# Patient Record
Sex: FEMALE | Race: WHITE
[De-identification: names, ages, dates, MRNs, and addresses within clinical notes are randomized per-mention and may not be internally consistent; named-entity substitution may affect disease eponyms.]

---

## 2017-09-12 ENCOUNTER — HOSPITAL ENCOUNTER (INPATIENT)
Dept: HOSPITAL 1 - ED | Age: 57
LOS: 2 days | Discharge: HOME | DRG: 243 | End: 2017-09-14
Attending: FAMILY MEDICINE | Admitting: FAMILY MEDICINE
Payer: COMMERCIAL

## 2017-09-12 VITALS — HEIGHT: 61 IN | BODY MASS INDEX: 25.57 KG/M2 | WEIGHT: 135.43 LBS

## 2017-09-12 VITALS — SYSTOLIC BLOOD PRESSURE: 131 MMHG | DIASTOLIC BLOOD PRESSURE: 56 MMHG

## 2017-09-12 VITALS — DIASTOLIC BLOOD PRESSURE: 70 MMHG | SYSTOLIC BLOOD PRESSURE: 109 MMHG

## 2017-09-12 DIAGNOSIS — Z53.29: ICD-10-CM

## 2017-09-12 DIAGNOSIS — K21.9: Primary | ICD-10-CM

## 2017-09-12 DIAGNOSIS — E44.1: ICD-10-CM

## 2017-09-12 DIAGNOSIS — F15.10: ICD-10-CM

## 2017-09-12 DIAGNOSIS — M94.0: ICD-10-CM

## 2017-09-12 DIAGNOSIS — E11.65: ICD-10-CM

## 2017-09-12 DIAGNOSIS — I10: ICD-10-CM

## 2017-09-12 DIAGNOSIS — F11.10: ICD-10-CM

## 2017-09-12 DIAGNOSIS — Z79.899: ICD-10-CM

## 2017-09-12 DIAGNOSIS — E78.00: ICD-10-CM

## 2017-09-12 DIAGNOSIS — N39.0: ICD-10-CM

## 2017-09-12 DIAGNOSIS — E03.9: ICD-10-CM

## 2017-09-12 DIAGNOSIS — Z88.6: ICD-10-CM

## 2017-09-12 DIAGNOSIS — E83.39: ICD-10-CM

## 2017-09-12 DIAGNOSIS — K76.0: ICD-10-CM

## 2017-09-12 LAB
ALBUMIN SERPL-MCNC: 3.5 G/DL (ref 3.4–5)
ALP SERPL-CCNC: 151 U/L (ref 46–116)
ALT SERPL-CCNC: 39 U/L (ref 14–59)
AST SERPL-CCNC: 33 U/L (ref 15–37)
BASOPHILS NFR BLD: 0.6 % (ref 0–2)
BILIRUB SERPL-MCNC: 0.3 MG/DL (ref 0.2–1)
BUN SERPL-MCNC: 14 MG/DL (ref 7–18)
CALCIUM SERPL-MCNC: 8.4 MG/DL (ref 8.5–10.1)
CHLORIDE SERPL-SCNC: 107 MMOL/L (ref 98–107)
CO2 SERPL-SCNC: 29.7 MMOL/L (ref 21–32)
CREAT SERPL-MCNC: 0.7 MG/DL (ref 0.6–1)
ERYTHROCYTE [DISTWIDTH] IN BLOOD BY AUTOMATED COUNT: 14 % (ref 11.5–14.5)
GFR SERPLBLD BASED ON 1.73 SQ M-ARVRAT: > 60 ML/MIN
GLUCOSE SERPL-MCNC: 95 MG/DL (ref 74–106)
LIPASE SERPL-CCNC: 335 IU/L (ref 73–393)
MAGNESIUM SERPL-MCNC: 1.8 MG/DL (ref 1.8–2.4)
MICROSCOPIC UR-IMP: YES
PHOSPHATE SERPL-MCNC: 2.9 MG/DL (ref 2.5–4.9)
PLATELET # BLD: 210 X10^3MCL (ref 130–400)
POTASSIUM SERPL-SCNC: 3.7 MMOL/L (ref 3.5–5.1)
PROT SERPL-MCNC: 7.2 G/DL (ref 6.4–8.2)
RBC # UR STRIP.AUTO: (no result) /UL
SODIUM SERPL-SCNC: 141 MMOL/L (ref 136–145)
T3 SERPL-MCNC: 0.98 NG/ML
T3RU NFR SERPL: 37 % UPTAKE (ref 30–39)
T4 FREE SERPL-MCNC: 1.06 NG/DL (ref 0.76–1.46)
T4 SERPL-MCNC: 7.4 UG/DL (ref 4.7–13.3)
T4/T3 UPTAKE INDEX SERPL: 2.7 UG/DL (ref 1.4–4.5)
UA SPECIFIC GRAVITY: 1.02 (ref 1–1.03)

## 2017-09-13 VITALS — SYSTOLIC BLOOD PRESSURE: 129 MMHG | DIASTOLIC BLOOD PRESSURE: 72 MMHG

## 2017-09-13 VITALS — DIASTOLIC BLOOD PRESSURE: 60 MMHG | SYSTOLIC BLOOD PRESSURE: 108 MMHG

## 2017-09-13 VITALS — DIASTOLIC BLOOD PRESSURE: 81 MMHG | SYSTOLIC BLOOD PRESSURE: 131 MMHG

## 2017-09-13 VITALS — DIASTOLIC BLOOD PRESSURE: 80 MMHG | SYSTOLIC BLOOD PRESSURE: 124 MMHG

## 2017-09-13 LAB — AMPHETAMINES UR QL SCN: POSITIVE

## 2017-09-14 VITALS — DIASTOLIC BLOOD PRESSURE: 81 MMHG | SYSTOLIC BLOOD PRESSURE: 120 MMHG

## 2017-09-14 VITALS — DIASTOLIC BLOOD PRESSURE: 69 MMHG | SYSTOLIC BLOOD PRESSURE: 110 MMHG

## 2017-09-14 LAB
BASOPHILS NFR BLD: 0.4 % (ref 0–2)
ERYTHROCYTE [DISTWIDTH] IN BLOOD BY AUTOMATED COUNT: 13.6 % (ref 11.5–14.5)
PLATELET # BLD: 194 X10^3MCL (ref 130–400)

## 2017-10-27 ENCOUNTER — HOSPITAL ENCOUNTER (EMERGENCY)
Dept: HOSPITAL 1 - ED | Age: 57
Discharge: HOME | End: 2017-10-27
Payer: COMMERCIAL

## 2017-10-27 VITALS — BODY MASS INDEX: 27.75 KG/M2 | WEIGHT: 141.36 LBS | HEIGHT: 60 IN

## 2017-10-27 VITALS — DIASTOLIC BLOOD PRESSURE: 78 MMHG | SYSTOLIC BLOOD PRESSURE: 132 MMHG
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
## 2017-10-27 DIAGNOSIS — S29.9XXA: Primary | ICD-10-CM
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
## 2017-10-27 DIAGNOSIS — F17.210: ICD-10-CM

## 2017-10-27 DIAGNOSIS — F12.90: ICD-10-CM
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
## 2017-10-27 DIAGNOSIS — Z71.6: ICD-10-CM
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
## 2017-10-27 DIAGNOSIS — E78.00: ICD-10-CM

## 2017-10-27 DIAGNOSIS — E89.0: ICD-10-CM

## 2018-08-29 ENCOUNTER — HOSPITAL ENCOUNTER (EMERGENCY)
Dept: HOSPITAL 1 - ED | Age: 58
Discharge: HOME | End: 2018-08-29
Payer: COMMERCIAL

## 2018-08-29 VITALS — SYSTOLIC BLOOD PRESSURE: 101 MMHG | DIASTOLIC BLOOD PRESSURE: 63 MMHG

## 2018-08-29 VITALS — WEIGHT: 142 LBS | HEIGHT: 60 IN | BODY MASS INDEX: 27.88 KG/M2

## 2018-08-29 DIAGNOSIS — R19.7: ICD-10-CM

## 2018-08-29 DIAGNOSIS — R10.33: Primary | ICD-10-CM

## 2018-08-29 DIAGNOSIS — R11.10: ICD-10-CM

## 2018-08-29 LAB
ALBUMIN SERPL-MCNC: 3.2 G/DL (ref 3.4–5)
ALP SERPL-CCNC: 134 U/L (ref 46–116)
ALT SERPL-CCNC: 32 U/L (ref 14–59)
AMYLASE SERPL-CCNC: 88 U/L (ref 25–115)
AST SERPL-CCNC: 22 U/L (ref 15–37)
BASOPHILS NFR BLD: 0.4 % (ref 0–2)
BILIRUB SERPL-MCNC: 0.2 MG/DL (ref 0.2–1)
BUN SERPL-MCNC: 13 MG/DL (ref 7–18)
CALCIUM SERPL-MCNC: 8.1 MG/DL (ref 8.5–10.1)
CHLORIDE SERPL-SCNC: 107 MMOL/L (ref 98–107)
CO2 SERPL-SCNC: 32.6 MMOL/L (ref 21–32)
CREAT SERPL-MCNC: 0.7 MG/DL (ref 0.6–1)
ERYTHROCYTE [DISTWIDTH] IN BLOOD BY AUTOMATED COUNT: 13.8 % (ref 11.5–14.5)
GFR SERPLBLD BASED ON 1.73 SQ M-ARVRAT: > 60 ML/MIN
GLUCOSE SERPL-MCNC: 112 MG/DL (ref 74–106)
LIPASE SERPL-CCNC: 413 IU/L (ref 73–393)
MICROSCOPIC UR-IMP: YES
PLATELET # BLD: 202 X10^3MCL (ref 130–400)
POTASSIUM SERPL-SCNC: 4.2 MMOL/L (ref 3.5–5.1)
PROT SERPL-MCNC: 6.5 G/DL (ref 6.4–8.2)
RBC # UR STRIP.AUTO: NEGATIVE /UL
SODIUM SERPL-SCNC: 143 MMOL/L (ref 136–145)
UA SPECIFIC GRAVITY: 1.02 (ref 1–1.03)

## 2019-03-12 ENCOUNTER — HOSPITAL ENCOUNTER (EMERGENCY)
Dept: HOSPITAL 1 - ED | Age: 59
Discharge: HOME | End: 2019-03-12
Payer: COMMERCIAL

## 2019-03-12 VITALS
HEIGHT: 64 IN | WEIGHT: 142.5 LBS | WEIGHT: 142.5 LBS | BODY MASS INDEX: 24.33 KG/M2 | HEIGHT: 64 IN | BODY MASS INDEX: 24.33 KG/M2

## 2019-03-12 VITALS — SYSTOLIC BLOOD PRESSURE: 141 MMHG | DIASTOLIC BLOOD PRESSURE: 73 MMHG

## 2019-03-12 DIAGNOSIS — S13.9XXA: ICD-10-CM
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
## 2019-03-12 DIAGNOSIS — I10: ICD-10-CM
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
## 2019-03-12 DIAGNOSIS — Z88.6: ICD-10-CM

## 2019-03-12 DIAGNOSIS — S22.019A: Primary | ICD-10-CM
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
## 2019-03-12 DIAGNOSIS — S39.012A: ICD-10-CM

## 2019-03-12 DIAGNOSIS — E78.00: ICD-10-CM

## 2019-03-25 ENCOUNTER — HOSPITAL ENCOUNTER (INPATIENT)
Dept: HOSPITAL 1 - ED | Age: 59
LOS: 2 days | Discharge: HOME | DRG: 203 | End: 2019-03-27
Attending: INTERNAL MEDICINE | Admitting: INTERNAL MEDICINE
Payer: COMMERCIAL

## 2019-03-25 VITALS
BODY MASS INDEX: 26.22 KG/M2 | WEIGHT: 148 LBS | WEIGHT: 148 LBS | HEIGHT: 63 IN | HEIGHT: 63 IN | BODY MASS INDEX: 26.22 KG/M2

## 2019-03-25 DIAGNOSIS — I10: ICD-10-CM

## 2019-03-25 DIAGNOSIS — R51: ICD-10-CM

## 2019-03-25 DIAGNOSIS — R07.89: Primary | ICD-10-CM

## 2019-03-25 DIAGNOSIS — E78.00: ICD-10-CM

## 2019-03-25 DIAGNOSIS — E78.5: ICD-10-CM

## 2019-03-25 DIAGNOSIS — F17.210: ICD-10-CM

## 2019-03-25 DIAGNOSIS — R73.03: ICD-10-CM

## 2019-03-25 DIAGNOSIS — E89.0: ICD-10-CM

## 2019-03-25 PROCEDURE — A9500 TC99M SESTAMIBI: HCPCS

## 2019-03-26 VITALS — DIASTOLIC BLOOD PRESSURE: 74 MMHG | SYSTOLIC BLOOD PRESSURE: 112 MMHG

## 2019-03-26 VITALS — DIASTOLIC BLOOD PRESSURE: 64 MMHG | SYSTOLIC BLOOD PRESSURE: 110 MMHG

## 2019-03-26 VITALS — SYSTOLIC BLOOD PRESSURE: 105 MMHG | DIASTOLIC BLOOD PRESSURE: 53 MMHG

## 2019-03-26 VITALS — SYSTOLIC BLOOD PRESSURE: 114 MMHG | DIASTOLIC BLOOD PRESSURE: 61 MMHG

## 2019-03-26 LAB
ALBUMIN SERPL-MCNC: 3.3 G/DL (ref 3.4–5)
ALP SERPL-CCNC: 152 U/L (ref 46–116)
ALT SERPL-CCNC: 50 U/L (ref 14–59)
AMPHETAMINES UR QL SCN: (no result)
AMYLASE SERPL-CCNC: 61 U/L (ref 25–115)
AST SERPL-CCNC: 30 U/L (ref 15–37)
BASOPHILS NFR BLD: 0.5 % (ref 0–2)
BILIRUB SERPL-MCNC: 0.25 MG/DL (ref 0.2–1)
BUN SERPL-MCNC: 18 MG/DL (ref 7–18)
CALCIUM SERPL-MCNC: 8.3 MG/DL (ref 8.5–10.1)
CHLORIDE SERPL-SCNC: 108 MMOL/L (ref 98–107)
CHOLEST SERPL-MCNC: 173 MG/DL (ref ?–200)
CO2 SERPL-SCNC: 28.2 MMOL/L (ref 21–32)
CREAT SERPL-MCNC: 0.8 MG/DL (ref 0.6–1)
ERYTHROCYTE [DISTWIDTH] IN BLOOD BY AUTOMATED COUNT: 13.2 % (ref 11.5–14.5)
GFR SERPLBLD BASED ON 1.73 SQ M-ARVRAT: > 60 ML/MIN
GLUCOSE SERPL-MCNC: 154 MG/DL (ref 74–106)
HDLC SERPL-MCNC: 50 MG/DL (ref 40–60)
LIPASE SERPL-CCNC: 302 IU/L (ref 73–393)
MICROSCOPIC UR-IMP: NO
PLATELET # BLD: 199 X10^3MCL (ref 130–400)
POTASSIUM SERPL-SCNC: 3.8 MMOL/L (ref 3.5–5.1)
PROT SERPL-MCNC: 6.7 G/DL (ref 6.4–8.2)
RBC # UR STRIP.AUTO: NEGATIVE /UL
SODIUM SERPL-SCNC: 143 MMOL/L (ref 136–145)
T4 SERPL-MCNC: 6.3 UG/DL (ref 4.7–13.3)
UA SPECIFIC GRAVITY: >=1.03 (ref 1–1.03)

## 2019-03-27 VITALS — SYSTOLIC BLOOD PRESSURE: 106 MMHG | DIASTOLIC BLOOD PRESSURE: 68 MMHG

## 2019-03-27 VITALS — DIASTOLIC BLOOD PRESSURE: 68 MMHG | SYSTOLIC BLOOD PRESSURE: 106 MMHG

## 2019-03-27 VITALS — SYSTOLIC BLOOD PRESSURE: 107 MMHG | DIASTOLIC BLOOD PRESSURE: 65 MMHG

## 2019-03-30 ENCOUNTER — HOSPITAL ENCOUNTER (EMERGENCY)
Dept: HOSPITAL 1 - ED | Age: 59
LOS: 1 days | Discharge: HOME | End: 2019-03-31
Payer: COMMERCIAL

## 2019-03-30 VITALS — HEIGHT: 64 IN | WEIGHT: 150 LBS | BODY MASS INDEX: 25.61 KG/M2

## 2019-03-30 DIAGNOSIS — Z98.890: ICD-10-CM

## 2019-03-30 DIAGNOSIS — Z88.6: ICD-10-CM
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
## 2019-03-30 DIAGNOSIS — I10: ICD-10-CM
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
## 2019-03-30 DIAGNOSIS — E78.00: ICD-10-CM

## 2019-03-30 DIAGNOSIS — G89.29: ICD-10-CM

## 2019-03-30 DIAGNOSIS — R51: Primary | ICD-10-CM
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
## 2019-03-31 VITALS — SYSTOLIC BLOOD PRESSURE: 106 MMHG | DIASTOLIC BLOOD PRESSURE: 59 MMHG

## 2019-05-10 ENCOUNTER — HOSPITAL ENCOUNTER (EMERGENCY)
Dept: HOSPITAL 1 - ED | Age: 59
Discharge: HOME | End: 2019-05-10
Payer: COMMERCIAL

## 2019-05-10 VITALS — HEIGHT: 61 IN | WEIGHT: 143 LBS | BODY MASS INDEX: 27 KG/M2

## 2019-05-10 VITALS — SYSTOLIC BLOOD PRESSURE: 106 MMHG | DIASTOLIC BLOOD PRESSURE: 60 MMHG

## 2019-05-10 DIAGNOSIS — Z98.890: ICD-10-CM

## 2019-05-10 DIAGNOSIS — Z88.6: ICD-10-CM

## 2019-05-10 DIAGNOSIS — R51: ICD-10-CM

## 2019-05-10 DIAGNOSIS — E78.00: ICD-10-CM

## 2019-05-10 DIAGNOSIS — K29.00: Primary | ICD-10-CM

## 2019-05-10 DIAGNOSIS — I10: ICD-10-CM

## 2019-05-10 LAB
ALBUMIN SERPL-MCNC: 4 G/DL (ref 3.4–5)
ALP SERPL-CCNC: 124 U/L (ref 46–116)
ALT SERPL-CCNC: 30 U/L (ref 14–59)
AST SERPL-CCNC: 26 U/L (ref 15–37)
BILIRUB SERPL-MCNC: 0.5 MG/DL (ref 0.2–1)
BUN SERPL-MCNC: 24 MG/DL (ref 7–18)
CALCIUM SERPL-MCNC: 8.7 MG/DL (ref 8.5–10.1)
CHLORIDE SERPL-SCNC: 103 MMOL/L (ref 98–107)
CO2 SERPL-SCNC: 26.4 MMOL/L (ref 21–32)
CREAT SERPL-MCNC: 1.1 MG/DL (ref 0.6–1)
ERYTHROCYTE [DISTWIDTH] IN BLOOD BY AUTOMATED COUNT: 13 % (ref 11.5–14.5)
GFR SERPLBLD BASED ON 1.73 SQ M-ARVRAT: 54 ML/MIN
GLUCOSE SERPL-MCNC: 203 MG/DL (ref 74–106)
LIPASE SERPL-CCNC: 163 IU/L (ref 73–393)
PLATELET # BLD: 228 X10^3MCL (ref 130–400)
POTASSIUM SERPL-SCNC: 3.2 MMOL/L (ref 3.5–5.1)
PROT SERPL-MCNC: 7.4 G/DL (ref 6.4–8.2)
SODIUM SERPL-SCNC: 139 MMOL/L (ref 136–145)

## 2019-08-08 ENCOUNTER — HOSPITAL ENCOUNTER (EMERGENCY)
Dept: HOSPITAL 1 - ED | Age: 59
Discharge: HOME | End: 2019-08-08
Payer: COMMERCIAL

## 2019-08-08 VITALS — WEIGHT: 150 LBS | BODY MASS INDEX: 28.32 KG/M2 | HEIGHT: 61 IN

## 2019-08-08 VITALS — DIASTOLIC BLOOD PRESSURE: 68 MMHG | SYSTOLIC BLOOD PRESSURE: 110 MMHG

## 2019-08-08 DIAGNOSIS — Z98.890: ICD-10-CM

## 2019-08-08 DIAGNOSIS — E78.00: ICD-10-CM

## 2019-08-08 DIAGNOSIS — R10.9: Primary | ICD-10-CM

## 2019-08-08 DIAGNOSIS — F12.90: ICD-10-CM

## 2019-08-08 DIAGNOSIS — R19.7: ICD-10-CM

## 2019-08-08 DIAGNOSIS — I10: ICD-10-CM

## 2019-08-08 DIAGNOSIS — Z88.6: ICD-10-CM

## 2019-08-08 DIAGNOSIS — R11.2: ICD-10-CM

## 2019-10-23 ENCOUNTER — HOSPITAL ENCOUNTER (EMERGENCY)
Dept: HOSPITAL 1 - ED | Age: 59
Discharge: HOME | End: 2019-10-23
Payer: COMMERCIAL

## 2019-10-23 VITALS — SYSTOLIC BLOOD PRESSURE: 117 MMHG | DIASTOLIC BLOOD PRESSURE: 82 MMHG

## 2019-10-23 VITALS — WEIGHT: 142 LBS | HEIGHT: 61 IN | BODY MASS INDEX: 26.81 KG/M2
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
## 2019-10-23 DIAGNOSIS — M25.511: Primary | ICD-10-CM
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
## 2019-12-13 ENCOUNTER — HOSPITAL ENCOUNTER (EMERGENCY)
Dept: HOSPITAL 1 - ED | Age: 59
Discharge: HOME | End: 2019-12-13
Payer: COMMERCIAL

## 2019-12-13 VITALS — SYSTOLIC BLOOD PRESSURE: 122 MMHG | DIASTOLIC BLOOD PRESSURE: 69 MMHG

## 2019-12-13 VITALS
HEIGHT: 61 IN | BODY MASS INDEX: 26.06 KG/M2 | BODY MASS INDEX: 26.06 KG/M2 | WEIGHT: 138 LBS | WEIGHT: 138 LBS | HEIGHT: 61 IN

## 2019-12-13 DIAGNOSIS — Z98.890: ICD-10-CM

## 2019-12-13 DIAGNOSIS — J11.1: Primary | ICD-10-CM

## 2019-12-13 DIAGNOSIS — I10: ICD-10-CM

## 2019-12-13 DIAGNOSIS — Z88.6: ICD-10-CM

## 2019-12-13 DIAGNOSIS — E78.00: ICD-10-CM

## 2019-12-13 DIAGNOSIS — Z90.89: ICD-10-CM

## 2020-02-23 ENCOUNTER — HOSPITAL ENCOUNTER (EMERGENCY)
Dept: HOSPITAL 1 - ED | Age: 60
Discharge: HOME | End: 2020-02-23
Payer: COMMERCIAL

## 2020-02-23 VITALS — WEIGHT: 152 LBS | BODY MASS INDEX: 28.7 KG/M2 | HEIGHT: 61 IN

## 2020-02-23 VITALS — DIASTOLIC BLOOD PRESSURE: 68 MMHG | SYSTOLIC BLOOD PRESSURE: 128 MMHG

## 2020-02-23 DIAGNOSIS — I10: ICD-10-CM

## 2020-02-23 DIAGNOSIS — Z88.6: ICD-10-CM

## 2020-02-23 DIAGNOSIS — Z90.89: ICD-10-CM

## 2020-02-23 DIAGNOSIS — M25.562: ICD-10-CM

## 2020-02-23 DIAGNOSIS — E78.00: ICD-10-CM

## 2020-02-23 DIAGNOSIS — E03.9: ICD-10-CM

## 2020-02-23 DIAGNOSIS — Z98.890: ICD-10-CM

## 2020-02-23 DIAGNOSIS — M75.31: Primary | ICD-10-CM

## 2020-03-04 ENCOUNTER — HOSPITAL ENCOUNTER (EMERGENCY)
Dept: HOSPITAL 1 - ED | Age: 60
Discharge: HOME | End: 2020-03-04
Payer: COMMERCIAL

## 2020-03-04 VITALS — SYSTOLIC BLOOD PRESSURE: 140 MMHG | DIASTOLIC BLOOD PRESSURE: 76 MMHG

## 2020-03-04 VITALS
HEIGHT: 62 IN | BODY MASS INDEX: 28.34 KG/M2 | HEIGHT: 62 IN | WEIGHT: 154 LBS | WEIGHT: 154 LBS | BODY MASS INDEX: 28.34 KG/M2

## 2020-03-04 DIAGNOSIS — R51: ICD-10-CM

## 2020-03-04 DIAGNOSIS — R11.0: ICD-10-CM

## 2020-03-04 DIAGNOSIS — E78.00: ICD-10-CM

## 2020-03-04 DIAGNOSIS — Z88.6: ICD-10-CM

## 2020-03-04 DIAGNOSIS — H66.91: Primary | ICD-10-CM

## 2020-03-04 DIAGNOSIS — I10: ICD-10-CM

## 2020-03-12 ENCOUNTER — HOSPITAL ENCOUNTER (EMERGENCY)
Dept: HOSPITAL 1 - ED | Age: 60
Discharge: HOME | End: 2020-03-12
Payer: COMMERCIAL

## 2020-03-12 VITALS — HEIGHT: 61 IN | BODY MASS INDEX: 30.78 KG/M2 | WEIGHT: 163 LBS

## 2020-03-12 VITALS — SYSTOLIC BLOOD PRESSURE: 128 MMHG | DIASTOLIC BLOOD PRESSURE: 63 MMHG

## 2020-03-12 DIAGNOSIS — Z98.890: ICD-10-CM

## 2020-03-12 DIAGNOSIS — R05: ICD-10-CM

## 2020-03-12 DIAGNOSIS — R51: ICD-10-CM

## 2020-03-12 DIAGNOSIS — R07.89: Primary | ICD-10-CM

## 2020-03-12 DIAGNOSIS — E78.00: ICD-10-CM

## 2020-03-12 DIAGNOSIS — Z88.6: ICD-10-CM

## 2020-03-12 DIAGNOSIS — I10: ICD-10-CM

## 2020-03-23 ENCOUNTER — HOSPITAL ENCOUNTER (INPATIENT)
Dept: HOSPITAL 1 - ED | Age: 60
LOS: 4 days | Discharge: HOME | DRG: 282 | End: 2020-03-27
Payer: COMMERCIAL

## 2020-03-23 VITALS — BODY MASS INDEX: 30.58 KG/M2 | WEIGHT: 162 LBS | HEIGHT: 61 IN
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
## 2020-03-23 DIAGNOSIS — E86.0: ICD-10-CM
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
## 2020-03-23 DIAGNOSIS — F10.10: ICD-10-CM

## 2020-03-23 DIAGNOSIS — K85.20: Primary | ICD-10-CM

## 2020-03-23 DIAGNOSIS — Z90.89: ICD-10-CM

## 2020-03-23 DIAGNOSIS — K59.00: ICD-10-CM

## 2020-03-23 DIAGNOSIS — Z79.1: ICD-10-CM

## 2020-03-23 DIAGNOSIS — K70.9: ICD-10-CM

## 2020-03-23 DIAGNOSIS — E78.00: ICD-10-CM

## 2020-03-23 DIAGNOSIS — Z71.41: ICD-10-CM

## 2020-03-23 DIAGNOSIS — E78.5: ICD-10-CM

## 2020-03-23 DIAGNOSIS — K21.9: ICD-10-CM

## 2020-03-23 LAB
ALBUMIN SERPL-MCNC: 3.6 G/DL (ref 3.4–5)
ALP SERPL-CCNC: 187 U/L (ref 46–116)
ALT SERPL-CCNC: 74 U/L (ref 14–59)
AST SERPL-CCNC: 35 U/L (ref 15–37)
BASOPHILS NFR BLD: 0.5 % (ref 0–2)
BILIRUB SERPL-MCNC: 0.2 MG/DL (ref 0.2–1)
BUN SERPL-MCNC: 24 MG/DL (ref 7–18)
CALCIUM SERPL-MCNC: 8.8 MG/DL (ref 8.5–10.1)
CHLORIDE SERPL-SCNC: 105 MMOL/L (ref 98–107)
CO2 SERPL-SCNC: 29.5 MMOL/L (ref 21–32)
CREAT SERPL-MCNC: 0.9 MG/DL (ref 0.6–1)
ERYTHROCYTE [DISTWIDTH] IN BLOOD BY AUTOMATED COUNT: 14.2 % (ref 11.5–14.5)
GFR SERPLBLD BASED ON 1.73 SQ M-ARVRAT: > 60 ML/MIN
GLUCOSE SERPL-MCNC: 142 MG/DL (ref 74–106)
LIPASE SERPL-CCNC: 1350 IU/L (ref 73–393)
PLATELET # BLD: 249 X10^3MCL (ref 130–400)
POTASSIUM SERPL-SCNC: 4.3 MMOL/L (ref 3.5–5.1)
PROT SERPL-MCNC: 7.3 G/DL (ref 6.4–8.2)
SODIUM SERPL-SCNC: 140 MMOL/L (ref 136–145)

## 2020-03-23 PROCEDURE — C9113 INJ PANTOPRAZOLE SODIUM, VIA: HCPCS

## 2020-03-23 PROCEDURE — G0378 HOSPITAL OBSERVATION PER HR: HCPCS

## 2020-03-23 PROCEDURE — G0480 DRUG TEST DEF 1-7 CLASSES: HCPCS

## 2020-03-24 VITALS — SYSTOLIC BLOOD PRESSURE: 109 MMHG | DIASTOLIC BLOOD PRESSURE: 52 MMHG

## 2020-03-24 VITALS — DIASTOLIC BLOOD PRESSURE: 61 MMHG | SYSTOLIC BLOOD PRESSURE: 111 MMHG

## 2020-03-24 VITALS — SYSTOLIC BLOOD PRESSURE: 101 MMHG | DIASTOLIC BLOOD PRESSURE: 56 MMHG

## 2020-03-24 VITALS — SYSTOLIC BLOOD PRESSURE: 92 MMHG | DIASTOLIC BLOOD PRESSURE: 43 MMHG

## 2020-03-24 VITALS — DIASTOLIC BLOOD PRESSURE: 48 MMHG | SYSTOLIC BLOOD PRESSURE: 93 MMHG

## 2020-03-24 VITALS — SYSTOLIC BLOOD PRESSURE: 104 MMHG | DIASTOLIC BLOOD PRESSURE: 47 MMHG

## 2020-03-24 VITALS — DIASTOLIC BLOOD PRESSURE: 53 MMHG | SYSTOLIC BLOOD PRESSURE: 138 MMHG

## 2020-03-24 LAB
BASOPHILS NFR BLD: 0.2 % (ref 0–2)
BUN SERPL-MCNC: 21 MG/DL (ref 7–18)
CALCIUM SERPL-MCNC: 8.1 MG/DL (ref 8.5–10.1)
CHLORIDE SERPL-SCNC: 106 MMOL/L (ref 98–107)
CHOLEST SERPL-MCNC: 156 MG/DL (ref ?–200)
CHOLEST/HDLC SERPL: 3.3 MG/DL
CO2 SERPL-SCNC: 26.6 MMOL/L (ref 21–32)
CREAT SERPL-MCNC: 0.8 MG/DL (ref 0.6–1)
ERYTHROCYTE [DISTWIDTH] IN BLOOD BY AUTOMATED COUNT: 14.3 % (ref 11.5–14.5)
GFR SERPLBLD BASED ON 1.73 SQ M-ARVRAT: > 60 ML/MIN
GLUCOSE SERPL-MCNC: 115 MG/DL (ref 74–106)
HDLC SERPL-MCNC: 48 MG/DL (ref 40–60)
LIPASE SERPL-CCNC: 463 IU/L (ref 73–393)
MAGNESIUM SERPL-MCNC: 2.2 MG/DL (ref 1.8–2.4)
PLATELET # BLD: 236 X10^3MCL (ref 130–400)
POTASSIUM SERPL-SCNC: 4.5 MMOL/L (ref 3.5–5.1)
SODIUM SERPL-SCNC: 140 MMOL/L (ref 136–145)
TRIGL SERPL-MCNC: 152 MG/DL (ref ?–150)

## 2020-03-25 VITALS — SYSTOLIC BLOOD PRESSURE: 126 MMHG | DIASTOLIC BLOOD PRESSURE: 60 MMHG

## 2020-03-25 VITALS — DIASTOLIC BLOOD PRESSURE: 49 MMHG | SYSTOLIC BLOOD PRESSURE: 115 MMHG

## 2020-03-25 VITALS — DIASTOLIC BLOOD PRESSURE: 55 MMHG | SYSTOLIC BLOOD PRESSURE: 136 MMHG

## 2020-03-25 VITALS — DIASTOLIC BLOOD PRESSURE: 53 MMHG | SYSTOLIC BLOOD PRESSURE: 123 MMHG

## 2020-03-25 VITALS — DIASTOLIC BLOOD PRESSURE: 63 MMHG | SYSTOLIC BLOOD PRESSURE: 113 MMHG

## 2020-03-25 LAB
ALBUMIN SERPL-MCNC: 2.7 G/DL (ref 3.4–5)
ALP SERPL-CCNC: 139 U/L (ref 46–116)
ALT SERPL-CCNC: 47 U/L (ref 14–59)
AST SERPL-CCNC: 22 U/L (ref 15–37)
BASOPHILS NFR BLD: 0.3 % (ref 0–2)
BILIRUB SERPL-MCNC: 0.39 MG/DL (ref 0.2–1)
BUN SERPL-MCNC: 17 MG/DL (ref 7–18)
CALCIUM SERPL-MCNC: 8.2 MG/DL (ref 8.5–10.1)
CHLORIDE SERPL-SCNC: 109 MMOL/L (ref 98–107)
CO2 SERPL-SCNC: 29 MMOL/L (ref 21–32)
CREAT SERPL-MCNC: 0.8 MG/DL (ref 0.6–1)
ERYTHROCYTE [DISTWIDTH] IN BLOOD BY AUTOMATED COUNT: 14.2 % (ref 11.5–14.5)
GFR SERPLBLD BASED ON 1.73 SQ M-ARVRAT: > 60 ML/MIN
GLUCOSE SERPL-MCNC: 98 MG/DL (ref 74–106)
LIPASE SERPL-CCNC: 218 IU/L (ref 73–393)
MAGNESIUM SERPL-MCNC: 2 MG/DL (ref 1.8–2.4)
PLATELET # BLD: 211 X10^3MCL (ref 130–400)
POTASSIUM SERPL-SCNC: 4.9 MMOL/L (ref 3.5–5.1)
PROT SERPL-MCNC: 6.1 G/DL (ref 6.4–8.2)
SODIUM SERPL-SCNC: 142 MMOL/L (ref 136–145)

## 2020-03-26 VITALS — SYSTOLIC BLOOD PRESSURE: 132 MMHG | DIASTOLIC BLOOD PRESSURE: 60 MMHG

## 2020-03-26 VITALS — SYSTOLIC BLOOD PRESSURE: 142 MMHG | DIASTOLIC BLOOD PRESSURE: 70 MMHG

## 2020-03-26 VITALS — SYSTOLIC BLOOD PRESSURE: 130 MMHG | DIASTOLIC BLOOD PRESSURE: 60 MMHG

## 2020-03-26 VITALS — SYSTOLIC BLOOD PRESSURE: 134 MMHG | DIASTOLIC BLOOD PRESSURE: 60 MMHG

## 2020-03-26 VITALS — SYSTOLIC BLOOD PRESSURE: 109 MMHG | DIASTOLIC BLOOD PRESSURE: 58 MMHG

## 2020-03-26 LAB
ALBUMIN SERPL-MCNC: 2.9 G/DL (ref 3.4–5)
ALP SERPL-CCNC: 133 U/L (ref 46–116)
ALT SERPL-CCNC: 41 U/L (ref 14–59)
AST SERPL-CCNC: 18 U/L (ref 15–37)
BASOPHILS NFR BLD: 0.7 % (ref 0–2)
BILIRUB SERPL-MCNC: 0.29 MG/DL (ref 0.2–1)
BUN SERPL-MCNC: 12 MG/DL (ref 7–18)
CALCIUM SERPL-MCNC: 8.3 MG/DL (ref 8.5–10.1)
CHLORIDE SERPL-SCNC: 110 MMOL/L (ref 98–107)
CO2 SERPL-SCNC: 27.8 MMOL/L (ref 21–32)
CREAT SERPL-MCNC: 0.9 MG/DL (ref 0.6–1)
ERYTHROCYTE [DISTWIDTH] IN BLOOD BY AUTOMATED COUNT: 13.8 % (ref 11.5–14.5)
GFR SERPLBLD BASED ON 1.73 SQ M-ARVRAT: > 60 ML/MIN
GLUCOSE SERPL-MCNC: 88 MG/DL (ref 74–106)
MAGNESIUM SERPL-MCNC: 1.8 MG/DL (ref 1.8–2.4)
PLATELET # BLD: 193 X10^3MCL (ref 130–400)
POTASSIUM SERPL-SCNC: 4.2 MMOL/L (ref 3.5–5.1)
PROT SERPL-MCNC: 6.1 G/DL (ref 6.4–8.2)
SODIUM SERPL-SCNC: 143 MMOL/L (ref 136–145)

## 2020-03-27 VITALS — SYSTOLIC BLOOD PRESSURE: 132 MMHG | DIASTOLIC BLOOD PRESSURE: 67 MMHG

## 2020-03-27 VITALS — DIASTOLIC BLOOD PRESSURE: 63 MMHG | SYSTOLIC BLOOD PRESSURE: 140 MMHG

## 2020-03-27 VITALS — SYSTOLIC BLOOD PRESSURE: 139 MMHG | DIASTOLIC BLOOD PRESSURE: 62 MMHG

## 2020-03-27 LAB
ALBUMIN SERPL-MCNC: 3 G/DL (ref 3.4–5)
ALP SERPL-CCNC: 176 U/L (ref 46–116)
ALT SERPL-CCNC: 54 U/L (ref 14–59)
AST SERPL-CCNC: 33 U/L (ref 15–37)
BASOPHILS NFR BLD: 0.7 % (ref 0–2)
BILIRUB SERPL-MCNC: 0.32 MG/DL (ref 0.2–1)
BUN SERPL-MCNC: 12 MG/DL (ref 7–18)
CALCIUM SERPL-MCNC: 8.7 MG/DL (ref 8.5–10.1)
CHLORIDE SERPL-SCNC: 109 MMOL/L (ref 98–107)
CO2 SERPL-SCNC: 29.8 MMOL/L (ref 21–32)
CREAT SERPL-MCNC: 0.9 MG/DL (ref 0.6–1)
ERYTHROCYTE [DISTWIDTH] IN BLOOD BY AUTOMATED COUNT: 13.4 % (ref 11.5–14.5)
GFR SERPLBLD BASED ON 1.73 SQ M-ARVRAT: > 60 ML/MIN
GLUCOSE SERPL-MCNC: 84 MG/DL (ref 74–106)
MAGNESIUM SERPL-MCNC: 2 MG/DL (ref 1.8–2.4)
PLATELET # BLD: 217 X10^3MCL (ref 130–400)
POTASSIUM SERPL-SCNC: 4.4 MMOL/L (ref 3.5–5.1)
PROT SERPL-MCNC: 6.3 G/DL (ref 6.4–8.2)
SODIUM SERPL-SCNC: 143 MMOL/L (ref 136–145)

## 2020-03-28 ENCOUNTER — HOSPITAL ENCOUNTER (EMERGENCY)
Dept: HOSPITAL 1 - ED | Age: 60
Discharge: HOME | End: 2020-03-28
Payer: COMMERCIAL

## 2020-03-28 VITALS — SYSTOLIC BLOOD PRESSURE: 106 MMHG | DIASTOLIC BLOOD PRESSURE: 56 MMHG

## 2020-03-28 VITALS
BODY MASS INDEX: 29.83 KG/M2 | BODY MASS INDEX: 29.83 KG/M2 | WEIGHT: 158 LBS | HEIGHT: 61 IN | HEIGHT: 61 IN | WEIGHT: 158 LBS

## 2020-03-28 DIAGNOSIS — K29.70: Primary | ICD-10-CM

## 2020-03-28 DIAGNOSIS — Z88.6: ICD-10-CM

## 2020-03-28 DIAGNOSIS — Z90.89: ICD-10-CM

## 2020-03-28 DIAGNOSIS — E78.00: ICD-10-CM

## 2020-03-28 DIAGNOSIS — Z98.890: ICD-10-CM

## 2020-03-28 DIAGNOSIS — I10: ICD-10-CM

## 2020-03-28 LAB
ALBUMIN SERPL-MCNC: 3.1 G/DL (ref 3.4–5)
ALP SERPL-CCNC: 217 U/L (ref 46–116)
ALT SERPL-CCNC: 81 U/L (ref 14–59)
AST SERPL-CCNC: 66 U/L (ref 15–37)
BASOPHILS NFR BLD: 0.4 % (ref 0–2)
BILIRUB SERPL-MCNC: 0.2 MG/DL (ref 0.2–1)
BUN SERPL-MCNC: 14 MG/DL (ref 7–18)
CALCIUM SERPL-MCNC: 8.3 MG/DL (ref 8.5–10.1)
CHLORIDE SERPL-SCNC: 107 MMOL/L (ref 98–107)
CO2 SERPL-SCNC: 28.4 MMOL/L (ref 21–32)
CREAT SERPL-MCNC: 0.8 MG/DL (ref 0.6–1)
ERYTHROCYTE [DISTWIDTH] IN BLOOD BY AUTOMATED COUNT: 13.8 % (ref 11.5–14.5)
GFR SERPLBLD BASED ON 1.73 SQ M-ARVRAT: > 60 ML/MIN
GLUCOSE SERPL-MCNC: 92 MG/DL (ref 74–106)
LIPASE SERPL-CCNC: 341 IU/L (ref 73–393)
PLATELET # BLD: 219 X10^3MCL (ref 130–400)
POTASSIUM SERPL-SCNC: 4.4 MMOL/L (ref 3.5–5.1)
PROT SERPL-MCNC: 6.4 G/DL (ref 6.4–8.2)
SODIUM SERPL-SCNC: 141 MMOL/L (ref 136–145)

## 2020-04-15 ENCOUNTER — HOSPITAL ENCOUNTER (EMERGENCY)
Dept: HOSPITAL 1 - ED | Age: 60
LOS: 1 days | Discharge: HOME | End: 2020-04-16
Payer: COMMERCIAL

## 2020-04-15 VITALS — BODY MASS INDEX: 30.68 KG/M2 | HEIGHT: 61 IN | WEIGHT: 162.5 LBS

## 2020-04-15 DIAGNOSIS — G43.909: Primary | ICD-10-CM

## 2020-04-15 DIAGNOSIS — E78.00: ICD-10-CM

## 2020-04-15 DIAGNOSIS — Z90.89: ICD-10-CM

## 2020-04-15 DIAGNOSIS — Z88.5: ICD-10-CM

## 2020-04-15 DIAGNOSIS — Z98.890: ICD-10-CM

## 2020-04-15 DIAGNOSIS — K62.5: ICD-10-CM

## 2020-04-15 DIAGNOSIS — I10: ICD-10-CM

## 2020-04-16 VITALS — DIASTOLIC BLOOD PRESSURE: 53 MMHG | SYSTOLIC BLOOD PRESSURE: 106 MMHG

## 2020-04-16 LAB
ALBUMIN SERPL-MCNC: 3.6 G/DL (ref 3.4–5)
ALP SERPL-CCNC: 145 U/L (ref 46–116)
ALT SERPL-CCNC: 41 U/L (ref 14–59)
AMYLASE SERPL-CCNC: 63 U/L (ref 25–115)
AST SERPL-CCNC: 30 U/L (ref 15–37)
BASOPHILS NFR BLD: 0.7 % (ref 0–2)
BILIRUB SERPL-MCNC: 0.21 MG/DL (ref 0.2–1)
BUN SERPL-MCNC: 22 MG/DL (ref 7–18)
CALCIUM SERPL-MCNC: 8.5 MG/DL (ref 8.5–10.1)
CHLORIDE SERPL-SCNC: 108 MMOL/L (ref 98–107)
CO2 SERPL-SCNC: 28.7 MMOL/L (ref 21–32)
CREAT SERPL-MCNC: 0.9 MG/DL (ref 0.6–1)
ERYTHROCYTE [DISTWIDTH] IN BLOOD BY AUTOMATED COUNT: 13.3 % (ref 11.5–14.5)
GFR SERPLBLD BASED ON 1.73 SQ M-ARVRAT: > 60 ML/MIN
GLUCOSE SERPL-MCNC: 131 MG/DL (ref 74–106)
LIPASE SERPL-CCNC: 281 IU/L (ref 73–393)
PLATELET # BLD: 217 X10^3MCL (ref 130–400)
POTASSIUM SERPL-SCNC: 4 MMOL/L (ref 3.5–5.1)
PROT SERPL-MCNC: 6.7 G/DL (ref 6.4–8.2)
SODIUM SERPL-SCNC: 144 MMOL/L (ref 136–145)

## 2020-06-28 ENCOUNTER — HOSPITAL ENCOUNTER (EMERGENCY)
Dept: HOSPITAL 1 - ED | Age: 60
Discharge: HOME | End: 2020-06-28
Payer: COMMERCIAL

## 2020-06-28 VITALS
HEIGHT: 62 IN | BODY MASS INDEX: 29.26 KG/M2 | WEIGHT: 159 LBS | WEIGHT: 159 LBS | BODY MASS INDEX: 29.26 KG/M2 | HEIGHT: 62 IN

## 2020-06-28 VITALS — DIASTOLIC BLOOD PRESSURE: 70 MMHG | SYSTOLIC BLOOD PRESSURE: 140 MMHG

## 2020-06-28 DIAGNOSIS — Z98.890: ICD-10-CM

## 2020-06-28 DIAGNOSIS — I10: ICD-10-CM

## 2020-06-28 DIAGNOSIS — N39.0: Primary | ICD-10-CM

## 2020-06-28 DIAGNOSIS — E78.00: ICD-10-CM

## 2020-06-28 DIAGNOSIS — Z90.89: ICD-10-CM

## 2020-06-28 DIAGNOSIS — R31.0: ICD-10-CM

## 2020-06-28 DIAGNOSIS — Z88.6: ICD-10-CM

## 2020-06-28 LAB
ALBUMIN SERPL-MCNC: 3.7 G/DL (ref 3.4–5)
ALP SERPL-CCNC: 164 U/L (ref 46–116)
ALT SERPL-CCNC: 35 U/L (ref 14–59)
AST SERPL-CCNC: 25 U/L (ref 15–37)
BASOPHILS NFR BLD: 0.4 % (ref 0–2)
BILIRUB SERPL-MCNC: 0.4 MG/DL (ref 0.2–1)
BUN SERPL-MCNC: 15 MG/DL (ref 7–18)
CALCIUM SERPL-MCNC: 8.4 MG/DL (ref 8.5–10.1)
CHLORIDE SERPL-SCNC: 104 MMOL/L (ref 98–107)
CO2 SERPL-SCNC: 27.9 MMOL/L (ref 21–32)
CREAT SERPL-MCNC: 0.8 MG/DL (ref 0.6–1)
ERYTHROCYTE [DISTWIDTH] IN BLOOD BY AUTOMATED COUNT: 13 % (ref 11.5–14.5)
GFR SERPLBLD BASED ON 1.73 SQ M-ARVRAT: > 60 ML/MIN
GLUCOSE SERPL-MCNC: 116 MG/DL (ref 74–106)
MICROSCOPIC UR-IMP: YES
PLATELET # BLD: 213 X10^3MCL (ref 130–400)
POTASSIUM SERPL-SCNC: 4.2 MMOL/L (ref 3.5–5.1)
PROT SERPL-MCNC: 7.2 G/DL (ref 6.4–8.2)
RBC # UR STRIP.AUTO: (no result) /UL
SODIUM SERPL-SCNC: 140 MMOL/L (ref 136–145)
UA SPECIFIC GRAVITY: 1.02 (ref 1–1.03)

## 2022-05-30 ENCOUNTER — HOSPITAL ENCOUNTER (EMERGENCY)
Dept: HOSPITAL 26 - MED | Age: 62
Discharge: HOME | End: 2022-05-30
Payer: COMMERCIAL

## 2022-05-30 VITALS — BODY MASS INDEX: 29.48 KG/M2 | WEIGHT: 156.13 LBS | HEIGHT: 61 IN

## 2022-05-30 VITALS — SYSTOLIC BLOOD PRESSURE: 125 MMHG | DIASTOLIC BLOOD PRESSURE: 76 MMHG

## 2022-05-30 DIAGNOSIS — Z86.39: ICD-10-CM

## 2022-05-30 DIAGNOSIS — Z20.822: ICD-10-CM

## 2022-05-30 DIAGNOSIS — Z88.6: ICD-10-CM

## 2022-05-30 DIAGNOSIS — H92.03: ICD-10-CM

## 2022-05-30 DIAGNOSIS — Z79.2: ICD-10-CM

## 2022-05-30 DIAGNOSIS — J02.9: Primary | ICD-10-CM

## 2022-05-30 DIAGNOSIS — Z79.899: ICD-10-CM

## 2022-05-30 DIAGNOSIS — I10: ICD-10-CM

## 2022-06-07 ENCOUNTER — HOSPITAL ENCOUNTER (EMERGENCY)
Dept: HOSPITAL 26 - MED | Age: 62
Discharge: HOME | End: 2022-06-07
Payer: COMMERCIAL

## 2022-06-07 VITALS — BODY MASS INDEX: 29.71 KG/M2 | HEIGHT: 60.5 IN | WEIGHT: 155.31 LBS

## 2022-06-07 VITALS — SYSTOLIC BLOOD PRESSURE: 137 MMHG | DIASTOLIC BLOOD PRESSURE: 74 MMHG

## 2022-06-07 VITALS — SYSTOLIC BLOOD PRESSURE: 155 MMHG | DIASTOLIC BLOOD PRESSURE: 79 MMHG

## 2022-06-07 DIAGNOSIS — Z20.822: ICD-10-CM

## 2022-06-07 DIAGNOSIS — E86.0: ICD-10-CM

## 2022-06-07 DIAGNOSIS — B34.9: Primary | ICD-10-CM

## 2022-06-07 LAB
ALBUMIN FLD-MCNC: 3.4 G/DL (ref 3.4–5)
ANION GAP SERPL CALCULATED.3IONS-SCNC: 9.7 MMOL/L (ref 8–16)
APPEARANCE UR: CLEAR
AST SERPL-CCNC: 42 U/L (ref 15–37)
BASOPHILS # BLD AUTO: 0 K/UL (ref 0–0.22)
BASOPHILS NFR BLD AUTO: 0.7 % (ref 0–2)
BILIRUB SERPL-MCNC: 0.2 MG/DL (ref 0–1)
BILIRUB UR QL STRIP: NEGATIVE
BUN SERPL-MCNC: 20 MG/DL (ref 7–18)
CHLORIDE SERPL-SCNC: 104 MMOL/L (ref 98–107)
CO2 SERPL-SCNC: 28.6 MMOL/L (ref 21–32)
COLOR UR: YELLOW
CREAT SERPL-MCNC: 0.8 MG/DL (ref 0.6–1.3)
EOSINOPHIL # BLD AUTO: 0.2 K/UL (ref 0–0.4)
EOSINOPHIL NFR BLD AUTO: 4.1 % (ref 0–4)
ERYTHROCYTE [DISTWIDTH] IN BLOOD BY AUTOMATED COUNT: 13.6 % (ref 11.6–13.7)
GFR SERPL CREATININE-BSD FRML MDRD: 94 ML/MIN (ref 90–?)
GLUCOSE SERPL-MCNC: 126 MG/DL (ref 74–106)
GLUCOSE UR STRIP-MCNC: NEGATIVE MG/DL
HCT VFR BLD AUTO: 38.1 % (ref 36–48)
HGB BLD-MCNC: 12.7 G/DL (ref 12–16)
HGB UR QL STRIP: NEGATIVE
LEUKOCYTE ESTERASE UR QL STRIP: NEGATIVE
LIPASE SERPL-CCNC: 176 U/L (ref 73–393)
LYMPHOCYTES # BLD AUTO: 1 K/UL (ref 2.5–16.5)
LYMPHOCYTES NFR BLD AUTO: 23.1 % (ref 20.5–51.1)
MCH RBC QN AUTO: 28 PG (ref 27–31)
MCHC RBC AUTO-ENTMCNC: 33 G/DL (ref 33–37)
MCV RBC AUTO: 84.7 FL (ref 80–94)
MONOCYTES # BLD AUTO: 0.5 K/UL (ref 0.8–1)
MONOCYTES NFR BLD AUTO: 11.2 % (ref 1.7–9.3)
NEUTROPHILS # BLD AUTO: 2.6 K/UL (ref 1.8–7.7)
NEUTROPHILS NFR BLD AUTO: 60.9 % (ref 42.2–75.2)
NITRITE UR QL STRIP: NEGATIVE
PH UR STRIP: 6.5 [PH] (ref 5–9)
PLATELET # BLD AUTO: 246 K/UL (ref 140–450)
POTASSIUM SERPL-SCNC: 4.3 MMOL/L (ref 3.5–5.1)
RBC # BLD AUTO: 4.5 MIL/UL (ref 4.2–5.4)
SODIUM SERPL-SCNC: 138 MMOL/L (ref 136–145)
WBC # BLD AUTO: 4.3 K/UL (ref 4.8–10.8)

## 2022-06-07 PROCEDURE — 71045 X-RAY EXAM CHEST 1 VIEW: CPT

## 2022-06-07 PROCEDURE — 96374 THER/PROPH/DIAG INJ IV PUSH: CPT

## 2022-06-07 PROCEDURE — 87426 SARSCOV CORONAVIRUS AG IA: CPT

## 2022-06-07 PROCEDURE — 36415 COLL VENOUS BLD VENIPUNCTURE: CPT

## 2022-06-07 PROCEDURE — 87081 CULTURE SCREEN ONLY: CPT

## 2022-06-07 PROCEDURE — 85025 COMPLETE CBC W/AUTO DIFF WBC: CPT

## 2022-06-07 PROCEDURE — 87804 INFLUENZA ASSAY W/OPTIC: CPT

## 2022-06-07 PROCEDURE — 83690 ASSAY OF LIPASE: CPT

## 2022-06-07 PROCEDURE — 99285 EMERGENCY DEPT VISIT HI MDM: CPT

## 2022-06-07 PROCEDURE — 80053 COMPREHEN METABOLIC PANEL: CPT

## 2022-06-07 PROCEDURE — 83880 ASSAY OF NATRIURETIC PEPTIDE: CPT

## 2022-06-07 PROCEDURE — 96361 HYDRATE IV INFUSION ADD-ON: CPT

## 2022-06-07 PROCEDURE — 84484 ASSAY OF TROPONIN QUANT: CPT

## 2022-06-07 PROCEDURE — 81003 URINALYSIS AUTO W/O SCOPE: CPT

## 2022-06-07 PROCEDURE — 96375 TX/PRO/DX INJ NEW DRUG ADDON: CPT

## 2022-06-07 PROCEDURE — 93005 ELECTROCARDIOGRAM TRACING: CPT

## 2022-06-07 NOTE — NUR
61 YO FEMALE BIB SPOUSE C/O BLURRY VISION AND CONSTANT THROBBING HEADACHE WITH 
7/10 PAIN THAT BEGAN THIS MORNING. PT STATES SHE HAD A THROAT INFECTION 1WK AGO 
AND HAS BEEN TAKING ABX. S/S BEGAN AT 9AM THIS MORNING. UPON ASSESSMENT, PT 
STATED TENDERNESS ON RIGHT UPPER AND LOWER QUADRANT AND R SIDED CVA TEMDERNESS. 
PT  IN ED LAST NIGHT WITH SAME S/S.



PMH: HTN, HYPERLIPIDEMIA

ALLERGIES: ASPRIN (HIVES)

MEDS: AMOXOCILLIN 1WK



CHARTED BY TANA YIN